# Patient Record
Sex: MALE | Race: WHITE | NOT HISPANIC OR LATINO
[De-identification: names, ages, dates, MRNs, and addresses within clinical notes are randomized per-mention and may not be internally consistent; named-entity substitution may affect disease eponyms.]

---

## 2022-05-12 PROBLEM — Z00.00 ENCOUNTER FOR PREVENTIVE HEALTH EXAMINATION: Status: ACTIVE | Noted: 2022-05-12

## 2022-05-16 ENCOUNTER — APPOINTMENT (OUTPATIENT)
Dept: ORTHOPEDIC SURGERY | Facility: CLINIC | Age: 67
End: 2022-05-16

## 2024-03-14 ENCOUNTER — APPOINTMENT (OUTPATIENT)
Dept: ORTHOPEDIC SURGERY | Facility: CLINIC | Age: 69
End: 2024-03-14
Payer: COMMERCIAL

## 2024-03-14 VITALS — BODY MASS INDEX: 32.18 KG/M2 | HEIGHT: 67 IN | WEIGHT: 205 LBS

## 2024-03-14 DIAGNOSIS — M17.0 BILATERAL PRIMARY OSTEOARTHRITIS OF KNEE: ICD-10-CM

## 2024-03-14 DIAGNOSIS — Z78.9 OTHER SPECIFIED HEALTH STATUS: ICD-10-CM

## 2024-03-14 DIAGNOSIS — M25.561 PAIN IN RIGHT KNEE: ICD-10-CM

## 2024-03-14 DIAGNOSIS — E11.9 TYPE 2 DIABETES MELLITUS W/OUT COMPLICATIONS: ICD-10-CM

## 2024-03-14 PROCEDURE — 73564 X-RAY EXAM KNEE 4 OR MORE: CPT | Mod: RT

## 2024-03-14 PROCEDURE — 99204 OFFICE O/P NEW MOD 45 MIN: CPT | Mod: 25

## 2024-03-14 PROCEDURE — 20610 DRAIN/INJ JOINT/BURSA W/O US: CPT | Mod: RT

## 2024-03-14 PROCEDURE — J3490M: CUSTOM

## 2024-03-14 RX ORDER — ORAL SEMAGLUTIDE 14 MG/1
TABLET ORAL
Refills: 0 | Status: ACTIVE | COMMUNITY

## 2024-03-15 PROBLEM — M17.0 OSTEOARTHRITIS OF BOTH KNEES: Status: ACTIVE | Noted: 2024-03-14

## 2024-03-15 NOTE — IMAGING
[Right] : right knee [All Views] : anteroposterior, lateral, skyline, and anteroposterior standing [advanced tricompartmental OA with medial compartment narrowing and varus alignment] : advanced tricompartmental OA with medial compartment narrowing and varus alignment [Moderate patellofemoral OA] : Moderate patellofemoral OA [de-identified] : The patient is a well appearing 68 year  old male of their stated age. Patient ambulates with a normal gait. Negative straight leg raise bilateral   Effected Knee: RIGHT                          ROM:  0-130 degrees Lachman: Negative Pivot Shift: Negative Anterior Drawer: Negative Posterior Drawer / Sag:Negative Varus Stress 0 degrees: Stable Varus Stress 30 degrees: Stable Valgus Stress 0 degrees: Stable Valgus Stress 30 degrees: Stable Medial Surya: Negative Lateral Surya: Negative Patella Glide: 2+ Patella Apprehension: Negative Patella Grind: Negative   Palpation: Medial Joint Line: Nontender Lateral Joint Line: Nontender Medial Collateral Ligament: Nontender Lateral Collateral Ligament/PLC: Nontender Distal Femur: Nontender Proximal Tibia: Nontender Tibial Tubercle: Nontender Distal Pole Patella: Nontender Quadriceps Tendon: Nontender &  Intact Patella Tendon: Nontender &  Intact Medial Femoral Condyle: Nontender Medial Distal Hamstring/PES: Nontender Lateral Distal Hamstring: Nontender Vastus Medialis Oblique: Nontender  Iliotibial Band: Nontender & Intact Medial Patellofemoral Ligament: Nontender Adductor: Nontender Proximal GSC-Plantaris: Nontender Calf: Supple & Nontender   Inspection: Deformity: No Erythema: No Ecchymosis: No Abrasions: No Effusion: No Prepatella Bursitis: No Neurologic Exam: Sensation L4-S1: Grossly Intact Motor Exam: Quadriceps: 5 out of 5 Hamstrings: 5 out of 5 EHL: 5 out of 5 FHL: 5 out of 5 TA: 5 out of 5 GS: 5 out of 5 Circulatory/Pulses: Dorsalis Pedis: 2+ Posterior Tibialis: 2+ Additional Pertinent Findings: None   Contralateral Knee:                             ROM: 0-145 degrees Other Pertinent Findings: None   Assessment: The patient is a 68 year  old male  with right knee pain and radiographic and physical exam findings consistent with medial and PF OA.   The patients condition is acute Documents/Results Reviewed Today: X-Ray right knee  Tests/Studies Independently Interpreted Today: X-Ray right knee reveals moderate to severe medial joint compartment narrowing with associated spur at the medial tibial plateau, moderate patellofemoral joint compartment narrowing. X-Ray left knee reveals moderate medial joint compartment narrowing. Pertinent findings include: +patella crepitus, medial joint line tenderness, 0-130  Confounding medical conditions/concerns: Tyle II Diabetes    Plan: Advised patient that they are a candidate for a total knee arthroplasty - patient expresses understanding and wishes to postpone surgical intervention. Referred patient to joint specialists, for further evaluation and discussion of TKA. In the meantime, we discussed conservative treatments in the form of injections. Patient elected to receive right knee Toradol Injection. Patient will start physical therapy, HEP, and stretching. Prescribed patient Naprosyn 500mg BID x 2 weeks, then PRN for pain management and inflammation - use as directed. Continue use of the Reparel knee sleeve. Discussed activity modifications in depth. Tests Ordered: Prescription Medications Ordered: Naprosyn 500mg  Braces/DME Ordered: None Activity/Work/Sports Status: None Additional Instructions: None Follow-Up: 6 weeks   Procedure Note: Musculoskeletal Injection Diagnosis:  Right knee medial and PF OA Procedure:  Right knee, superolateral, 8/2 Toradol Injection   Indication:  The patient has had persistent pain despite conservative treatment.  Risks, benefits and alternatives to procedure were discussed; all questions were answered to the patient's apparent satisfaction and informed consent obtained.  Consent form was signed and dated today.  The patient denied prior problems with local anesthetics, injectable cortisones, chicken allergy, coagulopathy and no relevant drug or preservative allergies or sensitivities.  The area of injection was prepared in a sterile fashion.  Prior to injection a 'Time Out' was conducted in accordance with [employer] policy and the site and nature of procedure verified with the patient.     Procedure:  The injection was carried out utilizing sterile technique from a superolateral arthroscopic portal position   0cc of clear synovial fluid was aspirated.  The specimen:  (X) appeared benign and was discarded  ( ) was sent for Culture / Cell Count / Crystal analysis / [_].  Injection into the target area with care taken to aspirate frequently to minimize the risk of intravascular injection was performed with:  ( ) 1cc of Depomedrol (80mg/ml)  ( ) 1cc of Dexamethasone (10mg/ml)  (X) 2cc of Toradol (30mg/ml)  (X) 8cc of 0.5% Bupivacaine  ( ) 1cc of 1% Lidocaine  ( ) 5cc of 32mg Zilretta, prepared and diluted per  instructions  ( ) 2 cc of Hylan G-F 20 (Synvisc) 16mg/2ml  ( ) 6 cc of Hylan G-F 20 (SynvisoOne) 16mg/2ml  ( ) 2cc of Euflexxa  ( ) 2cc of Orthovisc  ( ) 2cc of GelOne  ( ) 3cc of Durolane (20mg/ml)   Patient tolerated the procedure well and direct pressure was applied for hemostasis. The patient was reminded of potential post-injection risks including, but not limited to, delayed hypersensitivity reactions and/or infection.  The patient verified that they had the office and the Emergency Room's contact information if any problems should arise.  After several minutes, the patient informed me that they felt fine and was released from the office.  The patient's current medication management of their orthopedic diagnosis was reviewed today: (1) We discussed a comprehensive treatment plan that included possible pharmaceutical management involving the use of prescription strength medications including but not limited to options such as oral Naprosyn 500mg BID, once daily Meloxicam 15 mg, or 500-650 mg Tylenol versus over the counter oral medications and topical prescription NSAID Pennsaid vs over the counter Voltaren gel.  Based on our extensive discussion, the patient was prescribed Naprosyn 500mg BID for two weeks.  It will then be used PRN for pain, inflammation and discomfort. (2) There is a moderate risk of morbidity with further treatment, especially from use of prescription strength medications and possible side effects of these medications which include upset stomach with oral medications, skin reactions to topical medications and cardiac/renal issues with long term use. (3) I recommended that the patient follow-up with their medical physician to discuss any significant specific potential issues with long term medication use such as interactions with current medications or with exacerbation of underlying medical comorbidities. (4) The benefits and risks associated with use of injectable, oral or topical, prescription and over the counter anti-inflammatory medications were discussed with the patient. The patient voiced understanding of the risks including but not limited to bleeding, stroke, kidney dysfunction, heart disease, and were referred to the black box warning label for further information.  I, Malou Quinonez, attest that this documentation has been prepared under the direction and in the presence of Provider Dr. Adria Cohen.  The documentation recorded by the scribe accurately reflects the services I, Dr. Adria Cohen, personally performed and the decisions made by me. [FreeTextEntry9] : moderate to severe medial joint compartment narrowing with associated spur at the medial tibial plateau, moderate patellofemoral joint compartment narrowing. X-Ray left knee reveals moderate medial joint compartment narrowing.

## 2024-03-15 NOTE — HISTORY OF PRESENT ILLNESS
[de-identified] : The patient is a 68 year  old R hand dominant male who presents today complaining of R knee pain.   Date of Injury/Onset: 03/2022 Pain:    At Rest: 7/10  With Activity:  10/10  Mechanism of injury: gradual onset, no specific CARY This is NOT a Work Related Injury being treated under Worker's Compensation. This is NOT an athletic injury occurring associated with an interscholastic or organized sports team. Quality of symptoms: aching, cracking, locking Improves with: rest, tylenol Worse with: prolonged walking/standing, stairs, prolonged sitting to standing Prior treatment: none Prior Imaging: none Out of work/sport: currently working School/Sport/Position/Occupation:  Additional Information: Has seen Dr. Cohen in march 2022 for R knee

## 2024-05-02 ENCOUNTER — APPOINTMENT (OUTPATIENT)
Dept: ORTHOPEDIC SURGERY | Facility: CLINIC | Age: 69
End: 2024-05-02
Payer: COMMERCIAL

## 2024-05-02 VITALS — WEIGHT: 200 LBS | BODY MASS INDEX: 31.39 KG/M2 | HEIGHT: 67 IN

## 2024-05-02 DIAGNOSIS — Z86.39 PERSONAL HISTORY OF OTHER ENDOCRINE, NUTRITIONAL AND METABOLIC DISEASE: ICD-10-CM

## 2024-05-02 DIAGNOSIS — Z78.9 OTHER SPECIFIED HEALTH STATUS: ICD-10-CM

## 2024-05-02 DIAGNOSIS — M17.11 UNILATERAL PRIMARY OSTEOARTHRITIS, RIGHT KNEE: ICD-10-CM

## 2024-05-02 PROCEDURE — 99215 OFFICE O/P EST HI 40 MIN: CPT

## 2024-05-02 NOTE — PHYSICAL EXAM
[Right] : right knee [] : patient ambulates without assistive device [TWNoteComboBox7] : flexion 130 degrees [de-identified] : extension 0 degrees

## 2024-05-02 NOTE — HISTORY OF PRESENT ILLNESS
[7] : 7 [Burning] : burning [Dull/Aching] : dull/aching [Sharp] : sharp [Shooting] : shooting [Stabbing] : stabbing [Throbbing] : throbbing [] : yes [Constant] : constant [Sleep] : sleep [Nothing helps with pain getting better] : Nothing helps with pain getting better [Standing] : standing [Walking] : walking [Stairs] : stairs [Full time] : Work status: full time [de-identified] : 5.2.24 NEW PATIENT HERE FOR RIGHT KNEE PAIN. PAIN STARTED 5 YEARS AGO. NO INJURY [FreeTextEntry7] : GOES UP TO THE GROIN [de-identified] : KNEELING, DRIVING

## 2024-05-02 NOTE — ASSESSMENT
[FreeTextEntry1] : 68 year M WITH MODERATE RT KNEE PAIN. HAS TRIED CSI AND GEL INJECTIONS IN THE PAST WITH MINIMAL RELIEF. PAIN WORSENS WITH STAIRS AND WALKING PROLONGED DISTANCES. PAIN IS AFFECTING ADL AND FUNCTIONAL ACTIVITIES. XRAYS REVIEWED WITH ADVANCED MEDIAL OA. TREATMENT OPTIONS REVIEWED. QUESTIONS ANSWERED. RT TKA DISCUSSED AT LENGTH. WILL ORDER A1C.  PMHx: DM (UNKNOWN A1C)  NO METAL ALLERGIES NO H/O DVT/PE NO H/O MRSA/VRSA  RT TKA -   DISCUSSED R&B OF TKA. WILL ORDER CT TO EVAL FOR BONE LOSS AND DEFORMITY FOR PRE-OP PLANNING.   The patient was advised of the diagnosis. The natural history of the pathology was explained in full to the patient in layman's terms. All questions were answered. The risks and benefits of surgical and non-surgical treatment alternatives were explained in full to the patient.   We discussed my findings and the natural history of their condition. We talked about the details of the proposed surgery and the recovery. We discussed the material risks, possible benefits and alternatives to surgery. The risks include but are not limited to infection, bleeding and possible need for blood transfusion, fracture, bowel blockage, bladder retention or infection, need for reoperation, stiffness and/or limited range of motion, possible damage to nerves and blood vessels, failure of fixation of components, risk of deep vein thromboses and pulmonary embolism, wound healing problems, dislocation, and possible leg length discrepancy. Although incredibly rare, we also discussed the risks of a cardiac event, stroke and even death during, or following, the surgery. We discussed the type of implants the patient will be receiving and the type of fixation that will be used, as well as whether a robot or computer navigation aide will be used. The patient understands they will need medical clearance and will attend a preoperative joint education class. We also discussed the type of anesthesia they will receive, and the risks associated with hospital or rehab length of stay, obesity, diabetes and smoking.   Patient Complains of pain in the affected joint with a level that often reaches greater than a 8/10. The pain has been progressively worsening throughout his/her treatment course. The pain has interfered with their ADLs and worsens with weight bearing. On exam they often have episodes of swelling/effusion with limited ROM. Pain worsens with ROM passive and active and I can palpate crepitus.   X- rays were reviewed with the patient and they show joint space narrowing, subchondral sclerosis, osteophyte formation, and subchondral cysts. After a period of more than 12 weeks physical therapy or exercise program done with me or another treating physician they have continued pain. The patient has failed a trial of NSAID medication or pain relievers if they were unable to tolerate NSAID medications as well as a series of injections, steroids, or hyaluronic acid. After a long discussion with the patient both the patient and I have decided we have exhausted all forms of less radical treatments and they would like to proceed with Total Joint Replacement.   Patient will plan to schedule surgery. Patient requires rolling walker and 3-in-1 commode S/P surgery. Patient currently as limited mobility that significantly impairs their ability to participate in one or more mobility related activities of daily living in the home. The patient is able to safely use the walker and the functional mobility deficit can be sufficiently resolved with the use of a walker. Patient will also be confined to one level of the home environment and there is no toilet on that level. Requires 3-in-1 commode for bedside use as patient cannot access bathroom safely in their home in timely manner. Will order rolling walker and 3-in-1 commode.